# Patient Record
Sex: FEMALE | Race: WHITE | Employment: UNEMPLOYED | URBAN - METROPOLITAN AREA
[De-identification: names, ages, dates, MRNs, and addresses within clinical notes are randomized per-mention and may not be internally consistent; named-entity substitution may affect disease eponyms.]

---

## 2022-12-03 ENCOUNTER — HOSPITAL ENCOUNTER (OUTPATIENT)
Age: 47
Setting detail: OBSERVATION
Discharge: ANOTHER HOSPITAL | End: 2022-12-04
Attending: EMERGENCY MEDICINE | Admitting: INTERNAL MEDICINE
Payer: COMMERCIAL

## 2022-12-03 ENCOUNTER — APPOINTMENT (OUTPATIENT)
Dept: ULTRASOUND IMAGING | Age: 47
End: 2022-12-03
Attending: EMERGENCY MEDICINE
Payer: COMMERCIAL

## 2022-12-03 VITALS
RESPIRATION RATE: 16 BRPM | TEMPERATURE: 98.3 F | OXYGEN SATURATION: 100 % | SYSTOLIC BLOOD PRESSURE: 108 MMHG | DIASTOLIC BLOOD PRESSURE: 66 MMHG | HEIGHT: 60 IN | BODY MASS INDEX: 25.97 KG/M2 | WEIGHT: 132.28 LBS | HEART RATE: 78 BPM

## 2022-12-03 DIAGNOSIS — R17 JAUNDICE: ICD-10-CM

## 2022-12-03 DIAGNOSIS — R74.01 TRANSAMINITIS: ICD-10-CM

## 2022-12-03 DIAGNOSIS — K80.42 CHOLEDOCHOLITHIASIS WITH ACUTE CHOLECYSTITIS: Primary | ICD-10-CM

## 2022-12-03 PROBLEM — K80.50 CHOLEDOCHOLITHIASIS: Status: ACTIVE | Noted: 2022-12-03

## 2022-12-03 LAB
ALBUMIN SERPL-MCNC: 4.2 G/DL (ref 3.5–5)
ALBUMIN/GLOB SERPL: 1.4 {RATIO} (ref 1.1–2.2)
ALP SERPL-CCNC: 233 U/L (ref 45–117)
ALT SERPL-CCNC: 398 U/L (ref 12–78)
ANION GAP SERPL CALC-SCNC: 14 MMOL/L (ref 5–15)
AST SERPL-CCNC: 190 U/L (ref 15–37)
BASOPHILS # BLD: 0 K/UL (ref 0–0.1)
BASOPHILS NFR BLD: 1 % (ref 0–1)
BILIRUB SERPL-MCNC: 5.6 MG/DL (ref 0.2–1)
BUN SERPL-MCNC: 10 MG/DL (ref 6–20)
BUN/CREAT SERPL: 12 (ref 12–20)
CALCIUM SERPL-MCNC: 8.5 MG/DL (ref 8.5–10.1)
CHLORIDE SERPL-SCNC: 101 MMOL/L (ref 97–108)
CO2 SERPL-SCNC: 23 MMOL/L (ref 21–32)
CREAT SERPL-MCNC: 0.85 MG/DL (ref 0.55–1.02)
DIFFERENTIAL METHOD BLD: ABNORMAL
EOSINOPHIL # BLD: 0.1 K/UL (ref 0–0.4)
EOSINOPHIL NFR BLD: 2 % (ref 0–7)
ERYTHROCYTE [DISTWIDTH] IN BLOOD BY AUTOMATED COUNT: 13.1 % (ref 11.5–14.5)
GLOBULIN SER CALC-MCNC: 3.1 G/DL (ref 2–4)
GLUCOSE SERPL-MCNC: 99 MG/DL (ref 65–100)
HCT VFR BLD AUTO: 37.6 % (ref 35–47)
HGB BLD-MCNC: 12.4 G/DL (ref 11.5–16)
IMM GRANULOCYTES # BLD AUTO: 0 K/UL (ref 0–0.04)
IMM GRANULOCYTES NFR BLD AUTO: 0 % (ref 0–0.5)
LIPASE SERPL-CCNC: 76 U/L (ref 73–393)
LYMPHOCYTES # BLD: 2.6 K/UL (ref 0.8–3.5)
LYMPHOCYTES NFR BLD: 35 % (ref 12–49)
MCH RBC QN AUTO: 29.7 PG (ref 26–34)
MCHC RBC AUTO-ENTMCNC: 33 G/DL (ref 30–36.5)
MCV RBC AUTO: 90.2 FL (ref 80–99)
MONOCYTES # BLD: 0.8 K/UL (ref 0–1)
MONOCYTES NFR BLD: 11 % (ref 5–13)
NEUTS SEG # BLD: 3.9 K/UL (ref 1.8–8)
NEUTS SEG NFR BLD: 52 % (ref 32–75)
NRBC # BLD: 0 K/UL (ref 0–0.01)
NRBC BLD-RTO: 0 PER 100 WBC
PLATELET # BLD AUTO: 281 K/UL (ref 150–400)
PMV BLD AUTO: 8.8 FL (ref 8.9–12.9)
POTASSIUM SERPL-SCNC: 3.7 MMOL/L (ref 3.5–5.1)
PROT SERPL-MCNC: 7.3 G/DL (ref 6.4–8.2)
RBC # BLD AUTO: 4.17 M/UL (ref 3.8–5.2)
SODIUM SERPL-SCNC: 138 MMOL/L (ref 136–145)
WBC # BLD AUTO: 7.4 K/UL (ref 3.6–11)

## 2022-12-03 PROCEDURE — 96375 TX/PRO/DX INJ NEW DRUG ADDON: CPT

## 2022-12-03 PROCEDURE — 65270000029 HC RM PRIVATE

## 2022-12-03 PROCEDURE — 83690 ASSAY OF LIPASE: CPT

## 2022-12-03 PROCEDURE — 36415 COLL VENOUS BLD VENIPUNCTURE: CPT

## 2022-12-03 PROCEDURE — 80053 COMPREHEN METABOLIC PANEL: CPT

## 2022-12-03 PROCEDURE — G0378 HOSPITAL OBSERVATION PER HR: HCPCS

## 2022-12-03 PROCEDURE — 74011250636 HC RX REV CODE- 250/636: Performed by: EMERGENCY MEDICINE

## 2022-12-03 PROCEDURE — 96365 THER/PROPH/DIAG IV INF INIT: CPT

## 2022-12-03 PROCEDURE — 74011250636 HC RX REV CODE- 250/636: Performed by: INTERNAL MEDICINE

## 2022-12-03 PROCEDURE — 85025 COMPLETE CBC W/AUTO DIFF WBC: CPT

## 2022-12-03 PROCEDURE — 99285 EMERGENCY DEPT VISIT HI MDM: CPT

## 2022-12-03 PROCEDURE — 74011000258 HC RX REV CODE- 258: Performed by: EMERGENCY MEDICINE

## 2022-12-03 PROCEDURE — 96374 THER/PROPH/DIAG INJ IV PUSH: CPT

## 2022-12-03 PROCEDURE — 96361 HYDRATE IV INFUSION ADD-ON: CPT

## 2022-12-03 PROCEDURE — 76705 ECHO EXAM OF ABDOMEN: CPT

## 2022-12-03 RX ORDER — ACETAMINOPHEN 325 MG/1
650 TABLET ORAL
Status: DISCONTINUED | OUTPATIENT
Start: 2022-12-03 | End: 2022-12-04 | Stop reason: HOSPADM

## 2022-12-03 RX ORDER — ONDANSETRON 2 MG/ML
4 INJECTION INTRAMUSCULAR; INTRAVENOUS
Status: DISCONTINUED | OUTPATIENT
Start: 2022-12-03 | End: 2022-12-04 | Stop reason: HOSPADM

## 2022-12-03 RX ORDER — SODIUM CHLORIDE 0.9 % (FLUSH) 0.9 %
5-40 SYRINGE (ML) INJECTION EVERY 8 HOURS
Status: DISCONTINUED | OUTPATIENT
Start: 2022-12-03 | End: 2022-12-04 | Stop reason: HOSPADM

## 2022-12-03 RX ORDER — SODIUM CHLORIDE 0.9 % (FLUSH) 0.9 %
5-40 SYRINGE (ML) INJECTION AS NEEDED
Status: DISCONTINUED | OUTPATIENT
Start: 2022-12-03 | End: 2022-12-04 | Stop reason: HOSPADM

## 2022-12-03 RX ORDER — KETOROLAC TROMETHAMINE 30 MG/ML
30 INJECTION, SOLUTION INTRAMUSCULAR; INTRAVENOUS
Status: COMPLETED | OUTPATIENT
Start: 2022-12-03 | End: 2022-12-03

## 2022-12-03 RX ORDER — DICLOFENAC SODIUM 75 MG/1
75 TABLET, DELAYED RELEASE ORAL
COMMUNITY

## 2022-12-03 RX ORDER — ONDANSETRON 4 MG/1
4 TABLET, ORALLY DISINTEGRATING ORAL
Status: DISCONTINUED | OUTPATIENT
Start: 2022-12-03 | End: 2022-12-04 | Stop reason: HOSPADM

## 2022-12-03 RX ORDER — ENOXAPARIN SODIUM 100 MG/ML
40 INJECTION SUBCUTANEOUS DAILY
Status: DISCONTINUED | OUTPATIENT
Start: 2022-12-04 | End: 2022-12-04 | Stop reason: HOSPADM

## 2022-12-03 RX ORDER — ACETAMINOPHEN 650 MG/1
650 SUPPOSITORY RECTAL
Status: DISCONTINUED | OUTPATIENT
Start: 2022-12-03 | End: 2022-12-04 | Stop reason: HOSPADM

## 2022-12-03 RX ORDER — MORPHINE SULFATE 2 MG/ML
2 INJECTION, SOLUTION INTRAMUSCULAR; INTRAVENOUS
Status: COMPLETED | OUTPATIENT
Start: 2022-12-03 | End: 2022-12-03

## 2022-12-03 RX ORDER — POLYETHYLENE GLYCOL 3350 17 G/17G
17 POWDER, FOR SOLUTION ORAL DAILY PRN
Status: DISCONTINUED | OUTPATIENT
Start: 2022-12-03 | End: 2022-12-04 | Stop reason: HOSPADM

## 2022-12-03 RX ADMIN — KETOROLAC TROMETHAMINE 30 MG: 30 INJECTION, SOLUTION INTRAMUSCULAR; INTRAVENOUS at 19:34

## 2022-12-03 RX ADMIN — MORPHINE SULFATE 2 MG: 2 INJECTION, SOLUTION INTRAMUSCULAR; INTRAVENOUS at 23:18

## 2022-12-03 RX ADMIN — ONDANSETRON 4 MG: 2 INJECTION INTRAMUSCULAR; INTRAVENOUS at 23:26

## 2022-12-03 RX ADMIN — SODIUM CHLORIDE 1000 ML: 9 INJECTION, SOLUTION INTRAVENOUS at 19:12

## 2022-12-03 RX ADMIN — PIPERACILLIN AND TAZOBACTAM 4.5 G: 4; .5 INJECTION, POWDER, FOR SOLUTION INTRAVENOUS at 22:27

## 2022-12-03 NOTE — ED TRIAGE NOTES
Pt presents to Ed with c/o 2 day of RUQ abdominal pain. She denies any nausea or vomiting. Pain is worse after eating.

## 2022-12-03 NOTE — Clinical Note
Status[de-identified] INPATIENT [101]   Type of Bed: Medical [8]   Inpatient Hospitalization Certified Necessary for the Following Reasons: 3.  Patient receiving treatment that can only be provided in an inpatient setting (further clarification in H&P documentation)   Admitting Diagnosis: Choledocholithiasis [261251]   Admitting Physician: Mimi Estrada   Attending Physician: Adalberto Ramos   Estimated Length of Stay: 2 Midnights   Discharge Plan[de-identified] Home with Office Follow-up

## 2022-12-04 PROCEDURE — 96376 TX/PRO/DX INJ SAME DRUG ADON: CPT

## 2022-12-04 PROCEDURE — G0378 HOSPITAL OBSERVATION PER HR: HCPCS

## 2022-12-04 PROCEDURE — 74011250636 HC RX REV CODE- 250/636: Performed by: EMERGENCY MEDICINE

## 2022-12-04 RX ORDER — MORPHINE SULFATE 2 MG/ML
2 INJECTION, SOLUTION INTRAMUSCULAR; INTRAVENOUS
Status: DISCONTINUED | OUTPATIENT
Start: 2022-12-04 | End: 2022-12-04 | Stop reason: HOSPADM

## 2022-12-04 RX ADMIN — MORPHINE SULFATE 2 MG: 2 INJECTION, SOLUTION INTRAMUSCULAR; INTRAVENOUS at 01:00

## 2022-12-04 NOTE — ED NOTES
TRANSFER - OUT REPORT:    Verbal report given to Inez Bravo RN(name) on Laurent Pedro  being transferred to [facility], (unit) for routine progression of care       Report consisted of patients Situation, Background, Assessment and   Recommendations(SBAR). Information from the following report(s) SBAR, ED Summary, [facility] and Recent Results was reviewed with the receiving nurse. Lines:   Peripheral IV 12/03/22 Right Antecubital (Active)   Site Assessment Clean, dry, & intact 12/03/22 1912   Phlebitis Assessment 0 12/03/22 1912   Infiltration Assessment 0 12/03/22 1912   Dressing Status Clean, dry, & intact 12/03/22 1912   Dressing Type Transparent 12/03/22 1912   Hub Color/Line Status Pink 12/03/22 1912   Alcohol Cap Used Yes 12/03/22 1912        Opportunity for questions and clarification was provided.       Patient transported with:   Tech/ JAMES

## 2022-12-04 NOTE — ED NOTES
Bedside and Verbal shift change report given to [facility] (oncoming nurse) by Trevor Graham (offgoing nurse). Report included the following information SBAR, ED Summary, MAR and Recent Results.

## 2022-12-04 NOTE — ED PROVIDER NOTES
52-year-old female with known history of gallstones presenting ER with 2 days of right upper quadrant abdominal pain. Patient reports previously would have intermittent episodes of biliary colic that would last an hour or 2 but then resolved. Has not seen a surgeon because her symptoms were so intermittent. However last 2 days patient has been having continuous pain that seems to wax and wane in intensity but never goes away. Pain radiates to her right flank/back. Also complains of mild shoulder pain. No report of any fevers or chills. Mild nausea without vomiting. No diarrhea constipation no urinary symptoms. Tried over-the-counter medications which normally would help her symptoms but not helping at this time. History reviewed. No pertinent past medical history. History reviewed. No pertinent surgical history. History reviewed. No pertinent family history. Social History     Socioeconomic History    Marital status:      Spouse name: Not on file    Number of children: Not on file    Years of education: Not on file    Highest education level: Not on file   Occupational History    Not on file   Tobacco Use    Smoking status: Former     Types: Cigarettes     Passive exposure: Never    Smokeless tobacco: Never   Substance and Sexual Activity    Alcohol use: Yes     Alcohol/week: 1.0 standard drink     Types: 1 Drinks containing 0.5 oz of alcohol per week    Drug use: Not on file    Sexual activity: Not on file   Other Topics Concern    Not on file   Social History Narrative    Not on file     Social Determinants of Health     Financial Resource Strain: Not on file   Food Insecurity: Not on file   Transportation Needs: Not on file   Physical Activity: Not on file   Stress: Not on file   Social Connections: Not on file   Intimate Partner Violence: Not on file   Housing Stability: Not on file         ALLERGIES: Patient has no known allergies.     Review of Systems   Constitutional: Positive for appetite change. Negative for chills and fever. HENT:  Negative for congestion and sore throat. Eyes:  Negative for pain. Respiratory:  Negative for shortness of breath. Cardiovascular:  Negative for chest pain. Gastrointestinal:  Positive for abdominal pain and nausea. Negative for blood in stool, constipation, diarrhea and vomiting. Genitourinary:  Negative for dysuria and flank pain. Musculoskeletal:  Negative for back pain and neck pain. Skin:  Positive for color change. Negative for rash. Neurological:  Negative for dizziness and headaches. All other systems reviewed and are negative. Vitals:    12/03/22 1814 12/03/22 1918 12/03/22 2030   BP: 127/69     Pulse: 78     Resp: 16     Temp: 98.3 °F (36.8 °C)     SpO2: 100% 100% 100%   Weight: 60 kg (132 lb 4.4 oz)     Height: 5' (1.524 m)              Physical Exam  Vitals and nursing note reviewed. Constitutional:       Appearance: She is well-developed. HENT:      Head: Normocephalic. Nose: Nose normal.      Mouth/Throat:      Mouth: Mucous membranes are moist.   Eyes:      General: Scleral icterus present. Conjunctiva/sclera: Conjunctivae normal.   Cardiovascular:      Rate and Rhythm: Normal rate and regular rhythm. Pulmonary:      Effort: Pulmonary effort is normal. No respiratory distress. Breath sounds: Normal breath sounds. Abdominal:      General: Abdomen is flat. Bowel sounds are normal.      Palpations: Abdomen is soft. Tenderness: There is abdominal tenderness in the right upper quadrant. There is no right CVA tenderness, left CVA tenderness, guarding or rebound. Positive signs include Piedra's sign. Negative signs include McBurney's sign. Hernia: No hernia is present. Musculoskeletal:         General: Normal range of motion. Cervical back: Normal range of motion and neck supple. Skin:     General: Skin is warm.       Capillary Refill: Capillary refill takes less than 2 seconds. Coloration: Skin is jaundiced. Findings: No rash. Neurological:      Mental Status: She is alert and oriented to person, place, and time. Comments: No gross motor or sensory deficits        MDM  Number of Diagnoses or Management Options  Choledocholithiasis with acute cholecystitis  Jaundice  Transaminitis  Diagnosis management comments: Patient with known history of gallstones having continued abdominal pain for the last 2 days and has not resolved. On exam patient appears jaundiced. Does have right upper quadrant abdominal pain with positive Piedra sign. Ordered labs and electrolytes concern for cholecystitis or obstructive stone like choledocholithiasis. Ordered lipase. Patient's lab came back elevated for elevated LFTs and bilirubin elevated at 5.6. Patient has slightly thickened gallbladder wall with multiple stones and sludge. Patient also has borderline dilation of the common bile duct. Concern for cholecystitis and choledocholithiasis. Spoke with general surgery, Dr. Yeyo Aguirre. Agrees that patient would likely need her gallbladder out at some point but patient needs to be admitted and be seen by GI for an ERCP. Will give patient antibiotics Vivi, spoke with hospitalist regarding patient for admission with GI consult. Patient initially refusing hospitalization after after extensive conversation about my concern about worsening symptoms and concern for infection the fact that this issue needs to be addressed patient finally relented and is agreeable with hospitalization    Total critical care time (not including time spent performing separately reportable procedures): 30min         Amount and/or Complexity of Data Reviewed  Clinical lab tests: reviewed  Tests in the radiology section of CPT®: reviewed      ED Course as of 12/03/22 2151   Sat Dec 03, 2022   1946 Bilirubin, total(!): 5.6 [ZD]   1946 ALT(!): 398 [ZD]   1946 AST(!): 190 [ZD]   1946 Alk.  phosphatase(!): 233 [ZD]   2131 [employer]  GALLBLADDER:  The gallbladder contains sludge and multiple stones, largest measuring 2.4 cm. There is borderline gallbladder wall thickening measuring 4 mm. No  pericholecystic fluid. COMMON BILE DUCT:  Borderline dilated measuring 6 mm in diameter. PANCREAS:  The visualized pancreas is normal.     RIGHT KIDNEY:  The right kidney measures 9.7 cm in length. The right kidney demonstrates normal  echogenicity with no contour deforming mass. No hydronephrosis. IMPRESSION  1. Cholelithiasis and sludge in the gallbladder, with borderline gallbladder  wall thickening, but no pericholecystic fluid. Findings may represent acute  cholecystitis in the appropriate clinical context. 2. Borderline dilated common bile duct measuring 6 mm. Correlate with serum  bilirubin levels, and consider MRCP for further evaluation if clinically  indicated. [ZD]   2144 Spoke with Yeyo Aguirre. Patient needs to be admitted by hospitalist and be seen by GI. Will need an ERCP. [ZD]      ED Course User Index  [ZD] Chantelle Clifford MD       Procedures        Perfect Serve Consult for Admission  9:51 PM    ED Room Number: WER07/07  Patient Name and age:  Otho Essex 56 y.o.  female  Working Diagnosis:   1. Choledocholithiasis with acute cholecystitis    2. Transaminitis    3. Jaundice        COVID-19 Suspicion:  no  Sepsis present:  no  Reassessment needed: no  Code Status:  Full Code  Readmission: no  Isolation Requirements:  no  Recommended Level of Care:  med/surg  Department:[facility] - [phone]  Other: Spoke with general surgery. Stating that this patient needs to be seen by GI for an ERCP. Patient's insurance was updated. Discovered that patient has [employer]. Called [facility] transfer center.   Spoken to Dr. Mona Duffy at [facility].  accepting patient to the ER

## 2022-12-04 NOTE — PROGRESS NOTES
TRANSFER - IN REPORT:    Verbal report received from April(name) on Shaw Leon  being received from [facility] ER(unit) for routine progression of care      Report consisted of patients Situation, Background, Assessment and   Recommendations(SBAR). Information from the following report(s) SBAR, Kardex, ED Summary, Intake/Output, MAR, and Recent Results was reviewed with the receiving nurse. Opportunity for questions and clarification was provided. Assessment completed upon patients arrival to unit and care assumed.        Transport eta about 0100AM

## 2022-12-07 NOTE — ADT AUTH CERT NOTES
DOWNGRADE NOTIFICATION     PLEASE DOWNGRADE THIS TO OBSERVATION - PLEASE FAX APPROVED OBS REF#  [address] [phone] OR CALL [address] [phone]- THANKS SO MUCH!     Dilan Mahoney   (DENIALS) [facility] DEPT REP     CLINICAL :     Gallbladder or Bile Duct Inflammation or Stone - Care Day 1 (12/3/2022) by Catracho Brandon       Review Status   Completed       Created By   Catracho Brandon      Criteria Review      Care Day: 1 Care Date: 12/3/2022 Level of Care: Inpatient Floor    Guideline Day 1    Clinical Status    ( ) * Clinical Indications met    (X) Pain, fever, or vomiting    12/5/2022 13:38:41 EST by Catracho Brandon      2 days of right upper quadrant abdominal pain. Routes    (X) IV fluids    12/5/2022 13:38:41 EST by Catracho Brandon      sodium chloride 0.9 % bolus infusion 1,000 mL x1 IV    (X) IV medications    12/5/2022 13:38:41 EST by Catracho Brandon      ED MEDS :  ondansetron [facility]) injection 4 mg x1 IV, ondansetron (ZOFRAN ODT) tablet 4 mg x1 PO    Interventions    (X) Abdominal x-rays, ultrasound    12/5/2022 13:38:41 EST by Catracho Brandon      SEE BELOW    (X) Laboratory tests    12/5/2022 13:38:41 EST by Catracho Brandon      CBC CMP    Medications    (X) Parenteral analgesics    12/5/2022 13:38:41 EST by Catracho Brandon      ED MEDS :  ketorolac (TORADOL) injection 30 mg x1 IV, morphine injection 2 mg x2 IV<    (X) Possible antibiotics    12/5/2022 13:38:41 EST by Catracho Brandon      piperacillin-tazobactam (ZOSYN) 4.5 g in 0.9% sodium chloride (MBP/ADV) 100 mL MBP x1 IV- ED MEDS    12/5/2022 13:38:41 EST by Catracho Brandon    Subject: Additional Clinical Information    IMPRESSION    1. Cholelithiasis and sludge in the gallbladder, with borderline gallbladder    wall thickening, but no pericholecystic fluid. Findings may represent acute    cholecystitis in the appropriate clinical context. 2. Borderline dilated common bile duct measuring 6 mm.  Correlate with serum    bilirubin levels, and consider MRCP for further evaluation if clinically    indicated. * Milestone   Additional Notes   DATE: 12/03      *Patient inpatient transferred out to different hospital [facility]      Chief Complaint/ED Presentation:   Jaundiced, abd pain       Relevant baselines: (lab values, vitals, o2 amount/delivery, etc.)   12/3/22 19:10   ALT: 398 (H)   AST: 190 (H)   Alk. phosphatase: 233 (H)      12/3/22 19:10   Bilirubin, total: 5.6 (H)            Vitals:   B/P 127/69       Pulse: 78       Resp: 16       Temp: 98.3 °F (36.8 °C)       SpO2: 100% 100% 100% on RA    Weight: 60 kg (132 lb 4.4 oz)       Height: 5' (1.524 m)             Abnl/Pertinent Labs/Radiology/Diagnostic Studies:   U/S   IMPRESSION   1. Cholelithiasis and sludge in the gallbladder, with borderline gallbladder   wall thickening, but no pericholecystic fluid. Findings may represent acute   cholecystitis in the appropriate clinical context. 2. Borderline dilated common bile duct measuring 6 mm. Correlate with serum   bilirubin levels, and consider MRCP for further evaluation if clinically   indicated. ED treatment:   Choledocholithiasis with acute cholecystitis   Jaundice   Transaminitis   Diagnosis management comments: Patient with known history of gallstones having continued abdominal pain for the last 2 days and has not resolved. On exam patient appears jaundiced. Does have right upper quadrant abdominal pain with positive Piedra sign. Ordered labs and electrolytes concern for cholecystitis or obstructive stone like choledocholithiasis. Ordered lipase. Patient's lab came back elevated for elevated LFTs and bilirubin elevated at 5.6. Patient has slightly thickened gallbladder wall with multiple stones and sludge. Patient also has borderline dilation of the common bile duct. Concern for cholecystitis and choledocholithiasis. Spoke with general surgery, Dr. Yeyo Aguirre.   Agrees that patient would likely need her gallbladder out at some point but patient needs to be admitted and be seen by GI for an ERCP. Will give patient antibiotics Vivi, spoke with hospitalist regarding patient for admission with GI consult. Patient initially refusing hospitalization after after extensive conversation about my concern about worsening symptoms and concern for infection the fact that this issue needs to be addressed patient finally relented and is agreeable with hospitalization      Admission Diagnosis/Op Note:   Jaundice    Transaminitis       Pertinent Medical History:   67-year-old female with known history of gallstones presenting ER with 2 days of right upper quadrant abdominal pain. Patient reports previously would have intermittent episodes of biliary colic that would last an hour or 2 but then resolved. Has not seen a surgeon because her symptoms were so intermittent. However last 2 days patient has been having continuous pain that seems to wax and wane in intensity but never goes away. Pain radiates to her right flank/back. Also complains of mild shoulder pain. No report of any fevers or chills. Mild nausea without vomiting. No diarrhea constipation no urinary symptoms. Tried over-the-counter medications which normally would help her symptoms but not helping at this time. Physical Exam:   Constitutional:        Appearance: She is well-developed. HENT:       Head: Normocephalic. Nose: Nose normal.       Mouth/Throat:       Mouth: Mucous membranes are moist.    Eyes:       General: Scleral icterus present. Conjunctiva/sclera: Conjunctivae normal.    Cardiovascular:       Rate and Rhythm: Normal rate and regular rhythm. Pulmonary:       Effort: Pulmonary effort is normal. No respiratory distress. Breath sounds: Normal breath sounds. Abdominal:       General: Abdomen is flat. Bowel sounds are normal.       Palpations: Abdomen is soft. Tenderness: There is abdominal tenderness in the right upper quadrant.  There is no right CVA tenderness, left CVA tenderness, guarding or rebound. Positive signs include Piedra's sign. Negative signs include McBurney's sign. Hernia: No hernia is present. Musculoskeletal:          General: Normal range of motion. Cervical back: Normal range of motion and neck supple. Skin:      General: Skin is warm. Capillary Refill: Capillary refill takes less than 2 seconds. Coloration: Skin is jaundiced. Findings: No rash. Neurological:       Mental Status: She is alert and oriented to person, place, and time.        Comments: No gross motor or sensory deficits